# Patient Record
Sex: FEMALE | Race: WHITE | NOT HISPANIC OR LATINO
[De-identification: names, ages, dates, MRNs, and addresses within clinical notes are randomized per-mention and may not be internally consistent; named-entity substitution may affect disease eponyms.]

---

## 2019-08-14 ENCOUNTER — APPOINTMENT (OUTPATIENT)
Dept: ENDOCRINOLOGY | Facility: CLINIC | Age: 59
End: 2019-08-14
Payer: COMMERCIAL

## 2019-08-14 VITALS
HEIGHT: 64 IN | WEIGHT: 239 LBS | DIASTOLIC BLOOD PRESSURE: 80 MMHG | HEART RATE: 73 BPM | SYSTOLIC BLOOD PRESSURE: 120 MMHG | BODY MASS INDEX: 40.8 KG/M2

## 2019-08-14 DIAGNOSIS — Z86.79 PERSONAL HISTORY OF OTHER DISEASES OF THE CIRCULATORY SYSTEM: ICD-10-CM

## 2019-08-14 DIAGNOSIS — Z83.3 FAMILY HISTORY OF DIABETES MELLITUS: ICD-10-CM

## 2019-08-14 DIAGNOSIS — Z86.39 PERSONAL HISTORY OF OTHER ENDOCRINE, NUTRITIONAL AND METABOLIC DISEASE: ICD-10-CM

## 2019-08-14 DIAGNOSIS — Z80.9 FAMILY HISTORY OF MALIGNANT NEOPLASM, UNSPECIFIED: ICD-10-CM

## 2019-08-14 DIAGNOSIS — Z87.39 PERSONAL HISTORY OF OTHER DISEASES OF THE MUSCULOSKELETAL SYSTEM AND CONNECTIVE TISSUE: ICD-10-CM

## 2019-08-14 DIAGNOSIS — Z87.891 PERSONAL HISTORY OF NICOTINE DEPENDENCE: ICD-10-CM

## 2019-08-14 DIAGNOSIS — Z83.49 FAMILY HISTORY OF OTHER ENDOCRINE, NUTRITIONAL AND METABOLIC DISEASES: ICD-10-CM

## 2019-08-14 DIAGNOSIS — Z87.09 PERSONAL HISTORY OF OTHER DISEASES OF THE RESPIRATORY SYSTEM: ICD-10-CM

## 2019-08-14 DIAGNOSIS — Z82.49 FAMILY HISTORY OF ISCHEMIC HEART DISEASE AND OTHER DISEASES OF THE CIRCULATORY SYSTEM: ICD-10-CM

## 2019-08-14 PROCEDURE — 99245 OFF/OP CONSLTJ NEW/EST HI 55: CPT

## 2019-08-14 RX ORDER — TOPIRAMATE 25 MG
25 CAPSULE, SPRINKLE ORAL
Refills: 0 | Status: ACTIVE | COMMUNITY

## 2019-08-14 RX ORDER — UBIDECARENONE/VIT E ACET 100MG-5
1000 CAPSULE ORAL
Refills: 0 | Status: ACTIVE | COMMUNITY

## 2019-08-14 RX ORDER — DIAZEPAM 2 MG/1
2 TABLET ORAL
Refills: 0 | Status: ACTIVE | COMMUNITY

## 2019-08-14 RX ORDER — CETIRIZINE HCL 10 MG
10 TABLET ORAL
Refills: 0 | Status: ACTIVE | COMMUNITY

## 2019-08-14 RX ORDER — ACETAMINOPHEN/DIPHENHYDRAMINE 500MG-25MG
1000 TABLET ORAL
Refills: 0 | Status: ACTIVE | COMMUNITY

## 2019-08-14 RX ORDER — MONTELUKAST SODIUM 10 MG/1
TABLET, FILM COATED ORAL
Refills: 0 | Status: ACTIVE | COMMUNITY

## 2019-08-14 RX ORDER — BACILLUS COAGULANS/INULIN 1B-250 MG
CAPSULE ORAL
Refills: 0 | Status: ACTIVE | COMMUNITY

## 2019-08-14 NOTE — ASSESSMENT
[Carbohydrate Consistent Diet] : carbohydrate consistent diet [Importance of Diet and Exercise] : importance of diet and exercise to improve glycemic control, achieve weight loss and improve cardiovascular health [FreeTextEntry1] : Abnormal biochemical tests: low cortisol am 1.1 \par she was supposed to have surgery for salivary gland but surgery cancelled because low K and they felt it was related to adrenal "pathology". She takes flovent 1 puff BID. She 's been taking steroids on and off for her asthma, courses of 14 days. Last course of steroids was 2-3 mos ago. 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 \par check cortisol  and ACTH \par check all pituitary panel. \par we might consider Cortrosyn stim test \par this could be secondary to steroids use??? secondary to Sjogren???? \par She had stim test done March 2019 but it was labeled wrong. \par \par NTMNG : check thyroid US \par never FNA before but she was told it was growing \par check TFts \par \par morbid obesitY: \par discussed diet and exercise\par encouraged more exercise walking 30 min 3 x week\par \par vitamin d : continue vitamin d 1000 u qd. \par Needs to try to have more protein for meals\par \par hyperlipidemia: she declines statin. check lipids today \par \par hyperG : check A1c \par \par \par

## 2019-08-14 NOTE — PHYSICAL EXAM
[Alert] : alert [No Acute Distress] : no acute distress [Well Nourished] : well nourished [Well Developed] : well developed [Normal Sclera/Conjunctiva] : normal sclera/conjunctiva [EOMI] : extra ocular movement intact [No Proptosis] : no proptosis [Thyroid Not Enlarged] : the thyroid was not enlarged [Normal Oropharynx] : the oropharynx was normal [No Thyroid Nodules] : there were no palpable thyroid nodules [No Respiratory Distress] : no respiratory distress [No Accessory Muscle Use] : no accessory muscle use [Clear to Auscultation] : lungs were clear to auscultation bilaterally [Normal Rate] : heart rate was normal  [Normal S1, S2] : normal S1 and S2 [Regular Rhythm] : with a regular rhythm [Pedal Pulses Normal] : the pedal pulses are present [No Edema] : there was no peripheral edema [Normal Bowel Sounds] : normal bowel sounds [Not Tender] : non-tender [Soft] : abdomen soft [Not Distended] : not distended [Post Cervical Nodes] : posterior cervical nodes [Anterior Cervical Nodes] : anterior cervical nodes [Normal] : normal and non tender [No Stigmata of Cushings Syndrome] : no stigmata of cushings syndrome [Spine Straight] : spine straight [Normal Gait] : normal gait [No Rash] : no rash [Normal Strength/Tone] : muscle strength and tone were normal [Abdominal Striae] : abdominal striae [Normal Reflexes] : deep tendon reflexes were 2+ and symmetric [No Tremors] : no tremors [Oriented x3] : oriented to person, place, and time [Acanthosis Nigricans] : no acanthosis nigricans [de-identified] : varicose veins on legs

## 2019-09-17 ENCOUNTER — RX RENEWAL (OUTPATIENT)
Age: 59
End: 2019-09-17

## 2019-09-18 ENCOUNTER — APPOINTMENT (OUTPATIENT)
Dept: ENDOCRINOLOGY | Facility: CLINIC | Age: 59
End: 2019-09-18

## 2019-09-20 ENCOUNTER — OTHER (OUTPATIENT)
Age: 59
End: 2019-09-20

## 2019-09-20 ENCOUNTER — APPOINTMENT (OUTPATIENT)
Dept: ENDOCRINOLOGY | Facility: CLINIC | Age: 59
End: 2019-09-20
Payer: COMMERCIAL

## 2019-09-20 PROCEDURE — 96372 THER/PROPH/DIAG INJ SC/IM: CPT

## 2019-09-20 RX ORDER — CORTICOTROPIN
POWDER (GRAM) MISCELLANEOUS
Refills: 0 | Status: COMPLETED | OUTPATIENT
Start: 2019-09-20

## 2019-10-01 LAB
ACTH SER-ACNC: 6.3 PG/ML
CORTIS SERPL-MCNC: 12.6 UG/DL
CORTIS SERPL-MCNC: 3.7 UG/DL
CORTIS SERPL-MCNC: 9.6 UG/DL

## 2019-12-03 ENCOUNTER — APPOINTMENT (OUTPATIENT)
Dept: ENDOCRINOLOGY | Facility: CLINIC | Age: 59
End: 2019-12-03
Payer: COMMERCIAL

## 2019-12-03 VITALS
HEART RATE: 77 BPM | HEIGHT: 64 IN | BODY MASS INDEX: 41.15 KG/M2 | DIASTOLIC BLOOD PRESSURE: 70 MMHG | SYSTOLIC BLOOD PRESSURE: 110 MMHG | WEIGHT: 241 LBS

## 2019-12-03 PROCEDURE — 99214 OFFICE O/P EST MOD 30 MIN: CPT

## 2019-12-03 RX ORDER — VALSARTAN 160 MG/1
160 TABLET ORAL
Refills: 0 | Status: ACTIVE | COMMUNITY

## 2019-12-03 RX ORDER — CORTICOTROPIN
POWDER (GRAM) MISCELLANEOUS
Qty: 1 | Refills: 0 | Status: DISCONTINUED | COMMUNITY
Start: 2019-09-17 | End: 2019-12-03

## 2019-12-03 NOTE — ASSESSMENT
[Carbohydrate Consistent Diet] : carbohydrate consistent diet [Importance of Diet and Exercise] : importance of diet and exercise to improve glycemic control, achieve weight loss and improve cardiovascular health [FreeTextEntry1] : Abnormal biochemical tests: low cortisol am 1.1 \par she had salivary gland surgery recently. She had cortrosyn stim test suboptimal , i suspect due to her inhaled steroids that she takes daily .She also had another oral course of steroids for 1 weeks. 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 \par discussed sick days management., Will give HC iv for the day of surgery just in case, since not able to do a perfect stim test to rule out AI. She is very asymptomatic for AI . \par all lab results reviewed and discussed with patient. All questions answered\par \par NTMNG : \par US report revised and results discussed with patient. \par Images reviewed and compared to prior study.\par 5 mm nodule R lobe unchanged. Monitor and repeat US in Sept 2020. \par \par morbid obesitY: \par discussed diet and exercise\par encouraged more exercise walking 30 min 3 x week\par \par vitamin d : continue vitamin d 1000 u qd. \par Needs to try to have more protein for meals\par \par hyperlipidemia: she declines statin. check lipids today \par LDl high 170 .She wants to try fish oil 2 mg qd \par repeat lipids in 3 mos. She wants to try first fish oil then she will think about statin.\par \par prediabetes :  A1c 6. \par discussed diet and exercise\par encouraged more exercise walking 30 min 3 x week\par Discussed complications of diabetes at length including MI/CVA/ESRD/dialysis/blindness/amputations/infections\par Needs to try to have more protein for meals\par \par Abnormal TFts : TSh slightly high \par will repeat TFts in 3 mos and TPo ab \par \par \par \par

## 2019-12-03 NOTE — PHYSICAL EXAM
[Alert] : alert [No Acute Distress] : no acute distress [Well Nourished] : well nourished [Well Developed] : well developed [Normal Sclera/Conjunctiva] : normal sclera/conjunctiva [EOMI] : extra ocular movement intact [No Proptosis] : no proptosis [Normal Oropharynx] : the oropharynx was normal [Thyroid Not Enlarged] : the thyroid was not enlarged [No Thyroid Nodules] : there were no palpable thyroid nodules [No Respiratory Distress] : no respiratory distress [No Accessory Muscle Use] : no accessory muscle use [Clear to Auscultation] : lungs were clear to auscultation bilaterally [Normal Rate] : heart rate was normal  [Normal S1, S2] : normal S1 and S2 [Regular Rhythm] : with a regular rhythm [Pedal Pulses Normal] : the pedal pulses are present [No Edema] : there was no peripheral edema [Normal Bowel Sounds] : normal bowel sounds [Not Tender] : non-tender [Soft] : abdomen soft [Not Distended] : not distended [Post Cervical Nodes] : posterior cervical nodes [Anterior Cervical Nodes] : anterior cervical nodes [Normal] : normal and non tender [Spine Straight] : spine straight [No Stigmata of Cushings Syndrome] : no stigmata of cushings syndrome [Normal Gait] : normal gait [Normal Strength/Tone] : muscle strength and tone were normal [No Rash] : no rash [Abdominal Striae] : abdominal striae [Normal Reflexes] : deep tendon reflexes were 2+ and symmetric [No Tremors] : no tremors [Oriented x3] : oriented to person, place, and time [Acanthosis Nigricans] : no acanthosis nigricans [de-identified] : varicose veins on legs

## 2019-12-03 NOTE — HISTORY OF PRESENT ILLNESS
[FreeTextEntry1] : follow up for abnormal cortisol in patient with severe asthma and frequent doses of steroids. \par Sd Sjogren

## 2020-01-17 ENCOUNTER — APPOINTMENT (OUTPATIENT)
Dept: ENDOCRINOLOGY | Facility: CLINIC | Age: 60
End: 2020-01-17
Payer: COMMERCIAL

## 2020-01-17 VITALS
BODY MASS INDEX: 40.97 KG/M2 | DIASTOLIC BLOOD PRESSURE: 90 MMHG | HEIGHT: 64 IN | HEART RATE: 78 BPM | WEIGHT: 240 LBS | SYSTOLIC BLOOD PRESSURE: 140 MMHG

## 2020-01-17 DIAGNOSIS — E04.2 NONTOXIC MULTINODULAR GOITER: ICD-10-CM

## 2020-01-17 PROCEDURE — 99214 OFFICE O/P EST MOD 30 MIN: CPT

## 2020-01-17 RX ORDER — ALBUTEROL SULFATE 0.63 MG/3ML
0.63 SOLUTION RESPIRATORY (INHALATION)
Refills: 0 | Status: ACTIVE | COMMUNITY

## 2020-01-17 RX ORDER — PREDNISONE 50 MG/1
50 TABLET ORAL
Refills: 0 | Status: ACTIVE | COMMUNITY

## 2020-01-17 NOTE — HISTORY OF PRESENT ILLNESS
[FreeTextEntry1] : follow up for abnormal cortisol in patient with severe asthma and frequent doses of steroids. \par Sd Sjogren\par she was admitted with flu and ascute asthma 2 weeks ago and she takes large amount of steroids again \par

## 2020-01-17 NOTE — PHYSICAL EXAM
[Alert] : alert [No Acute Distress] : no acute distress [Well Developed] : well developed [Well Nourished] : well nourished [Normal Sclera/Conjunctiva] : normal sclera/conjunctiva [EOMI] : extra ocular movement intact [Normal Oropharynx] : the oropharynx was normal [No Proptosis] : no proptosis [No Thyroid Nodules] : there were no palpable thyroid nodules [Thyroid Not Enlarged] : the thyroid was not enlarged [No Accessory Muscle Use] : no accessory muscle use [Normal Rate] : heart rate was normal  [Normal S1, S2] : normal S1 and S2 [Pedal Pulses Normal] : the pedal pulses are present [Regular Rhythm] : with a regular rhythm [No Edema] : there was no peripheral edema [Not Tender] : non-tender [Soft] : abdomen soft [Normal Bowel Sounds] : normal bowel sounds [Post Cervical Nodes] : posterior cervical nodes [Anterior Cervical Nodes] : anterior cervical nodes [Not Distended] : not distended [Normal] : normal and non tender [Spine Straight] : spine straight [No Stigmata of Cushings Syndrome] : no stigmata of cushings syndrome [Normal Gait] : normal gait [No Rash] : no rash [Abdominal Striae] : abdominal striae [Oriented x3] : oriented to person, place, and time [No Tremors] : no tremors [Acanthosis Nigricans] : no acanthosis nigricans [de-identified] : varicose veins on legs  [de-identified] : wheezing B/L

## 2020-01-17 NOTE — ASSESSMENT
[Importance of Diet and Exercise] : importance of diet and exercise to improve glycemic control, achieve weight loss and improve cardiovascular health [Carbohydrate Consistent Diet] : carbohydrate consistent diet [FreeTextEntry1] : Abnormal biochemical tests: low cortisol in the past probably checked after courses of steroids. Right now recovering from flu and severe acute asthma. She is on large steroids She will notify me when steroids will be stopped. \par She had a suboptimal stim test done but she is on daily inhaled steroids . \par \par NTMNG : \par US report revised and results discussed with patient. \par Images reviewed and compared to prior study.\par 5 mm nodule R lobe unchanged. Monitor and repeat US in Sept 2020. \par \par morbid obesitY: \par discussed diet and exercise\par encouraged more exercise walking 30 min 3 x week\par \par vitamin d : continue vitamin d 1000 u qd. \par \par hyperlipidemia: she declines statin. LDL better down to 134. \par start fish oil 2 mg qd \par repeat lipids in 3 mos. She wants to try first fish oil then she will think about statin.\par \par prediabetes :  A1c 6. \par discussed diet and exercise\par encouraged more exercise walking 30 min 3 x week\par Discussed complications of diabetes at length including MI/CVA/ESRD/dialysis/blindness/amputations/infections\par Needs to try to have more protein for meals\par \par Abnormal TFts : TSH slightly suppressed. Could be nonthyroidal disease. \par will monitor and repeat labs in 3 mos. \par \par \par \par

## 2020-02-04 RX ORDER — BLOOD-GLUCOSE METER
W/DEVICE EACH MISCELLANEOUS
Qty: 1 | Refills: 0 | Status: ACTIVE | COMMUNITY
Start: 2020-02-04 | End: 1900-01-01

## 2020-02-04 RX ORDER — BLOOD SUGAR DIAGNOSTIC
STRIP MISCELLANEOUS
Qty: 1 | Refills: 0 | Status: ACTIVE | COMMUNITY
Start: 2020-02-04 | End: 1900-01-01

## 2020-02-04 RX ORDER — LANCETS
EACH MISCELLANEOUS
Qty: 1 | Refills: 0 | Status: ACTIVE | COMMUNITY
Start: 2020-02-04 | End: 1900-01-01

## 2020-03-03 ENCOUNTER — APPOINTMENT (OUTPATIENT)
Dept: ENDOCRINOLOGY | Facility: CLINIC | Age: 60
End: 2020-03-03
Payer: COMMERCIAL

## 2020-03-03 VITALS
SYSTOLIC BLOOD PRESSURE: 142 MMHG | WEIGHT: 250 LBS | BODY MASS INDEX: 42.68 KG/M2 | DIASTOLIC BLOOD PRESSURE: 88 MMHG | HEIGHT: 64 IN

## 2020-03-03 DIAGNOSIS — R73.03 PREDIABETES.: ICD-10-CM

## 2020-03-03 DIAGNOSIS — R94.7 ABNORMAL RESULTS OF OTHER ENDOCRINE FUNCTION STUDIES: ICD-10-CM

## 2020-03-03 DIAGNOSIS — E78.5 HYPERLIPIDEMIA, UNSPECIFIED: ICD-10-CM

## 2020-03-03 DIAGNOSIS — R73.9 HYPERGLYCEMIA, UNSPECIFIED: ICD-10-CM

## 2020-03-03 PROCEDURE — 99214 OFFICE O/P EST MOD 30 MIN: CPT

## 2020-03-03 RX ORDER — CLINDAMYCIN HYDROCHLORIDE 150 MG/1
150 CAPSULE ORAL
Refills: 0 | Status: DISCONTINUED | COMMUNITY
End: 2020-03-03

## 2020-03-03 RX ORDER — FLUTICASONE PROPION/SALMETEROL 500-50 MCG
500-50 BLISTER, WITH INHALATION DEVICE INHALATION
Refills: 0 | Status: ACTIVE | COMMUNITY

## 2020-03-03 RX ORDER — FLUTICASONE PROPIONATE 110 UG/1
110 AEROSOL, METERED RESPIRATORY (INHALATION)
Refills: 0 | Status: DISCONTINUED | COMMUNITY
End: 2020-03-03

## 2020-03-03 NOTE — PHYSICAL EXAM
[Alert] : alert [Well Nourished] : well nourished [No Acute Distress] : no acute distress [Normal Sclera/Conjunctiva] : normal sclera/conjunctiva [EOMI] : extra ocular movement intact [Well Developed] : well developed [Normal Oropharynx] : the oropharynx was normal [No Proptosis] : no proptosis [Thyroid Not Enlarged] : the thyroid was not enlarged [No Thyroid Nodules] : there were no palpable thyroid nodules [No Accessory Muscle Use] : no accessory muscle use [Normal Rate] : heart rate was normal  [Normal S1, S2] : normal S1 and S2 [Regular Rhythm] : with a regular rhythm [No Edema] : there was no peripheral edema [Pedal Pulses Normal] : the pedal pulses are present [Normal Bowel Sounds] : normal bowel sounds [Not Tender] : non-tender [Soft] : abdomen soft [Post Cervical Nodes] : posterior cervical nodes [Not Distended] : not distended [Anterior Cervical Nodes] : anterior cervical nodes [Normal] : normal and non tender [Spine Straight] : spine straight [No Stigmata of Cushings Syndrome] : no stigmata of cushings syndrome [No Rash] : no rash [Normal Gait] : normal gait [Abdominal Striae] : abdominal striae [No Tremors] : no tremors [Oriented x3] : oriented to person, place, and time [Acanthosis Nigricans] : no acanthosis nigricans [de-identified] : full supraclavicular fossae, dorsocervical fat pad [de-identified] : wheezing B/L  [de-identified] : varicose veins on legs

## 2020-03-03 NOTE — ASSESSMENT
[Carbohydrate Consistent Diet] : carbohydrate consistent diet [Importance of Diet and Exercise] : importance of diet and exercise to improve glycemic control, achieve weight loss and improve cardiovascular health [FreeTextEntry1] : Abnormal biochemical tests: low cortisol in the past probably checked after courses of steroids. \par Now off steroids for 4 weeks. She has no sx of AI. She had low levels in the past but due to checking after a course of steroids. She has developed Cushingoid -like features due to high and frequent doses of steroids with full supraclavicular fossae and dorsocervical fat pad. . \par \par NTMNG : \par 5 mm nodule R lobe unchanged. Monitor and repeat US in Sept 2020. \par \par morbid obesitY: she gained 10 lbs \par discussed diet and exercise\par encouraged more exercise walking 30 min 3 x week\par \par vitamin d : continue vitamin d 1000 u qd. \par \par hyperlipidemia: she declines statin for now. LDl high for her prediabetes and obesity as risk factors. She want sa trial of fish oil 2 gm qd and repeat lipids in 3 mos and if still high LDL add statin. \par \par prediabetes :  A1c 6 stable for now. Probably steroids induced. \par discussed diet and exercise\par encouraged more exercise walking 30 min 3 x week\par \par Abnormal TFts : TSH slightly suppressed. Could be nonthyroidal disease. \par will monitor and repeat labs in 3 mos. \par \par \par \par

## 2020-03-03 NOTE — HISTORY OF PRESENT ILLNESS
[FreeTextEntry1] : follow up for abnormal cortisol in patient with severe asthma and frequent doses of steroids. \par Sd Sjogren\par

## 2020-09-09 ENCOUNTER — APPOINTMENT (OUTPATIENT)
Dept: ENDOCRINOLOGY | Facility: CLINIC | Age: 60
End: 2020-09-09

## 2022-10-29 ENCOUNTER — NON-APPOINTMENT (OUTPATIENT)
Age: 62
End: 2022-10-29

## 2023-01-13 ENCOUNTER — NON-APPOINTMENT (OUTPATIENT)
Age: 63
End: 2023-01-13

## 2023-03-01 ENCOUNTER — NON-APPOINTMENT (OUTPATIENT)
Age: 63
End: 2023-03-01

## 2023-03-03 ENCOUNTER — NON-APPOINTMENT (OUTPATIENT)
Age: 63
End: 2023-03-03

## 2023-06-13 ENCOUNTER — APPOINTMENT (OUTPATIENT)
Dept: COLORECTAL SURGERY | Facility: CLINIC | Age: 63
End: 2023-06-13
Payer: COMMERCIAL

## 2023-06-13 ENCOUNTER — NON-APPOINTMENT (OUTPATIENT)
Age: 63
End: 2023-06-13

## 2023-06-13 VITALS
SYSTOLIC BLOOD PRESSURE: 130 MMHG | WEIGHT: 232 LBS | OXYGEN SATURATION: 98 % | HEIGHT: 64 IN | TEMPERATURE: 97.3 F | DIASTOLIC BLOOD PRESSURE: 72 MMHG | HEART RATE: 78 BPM | BODY MASS INDEX: 39.61 KG/M2 | RESPIRATION RATE: 15 BRPM

## 2023-06-13 PROCEDURE — 99205 OFFICE O/P NEW HI 60 MIN: CPT

## 2023-06-13 RX ORDER — METRONIDAZOLE 500 MG/1
500 TABLET ORAL
Qty: 6 | Refills: 0 | Status: ACTIVE | COMMUNITY
Start: 2023-06-13 | End: 1900-01-01

## 2023-06-13 RX ORDER — NEOMYCIN SULFATE 500 MG/1
500 TABLET ORAL
Qty: 6 | Refills: 0 | Status: ACTIVE | COMMUNITY
Start: 2023-06-13 | End: 1900-01-01

## 2023-06-13 NOTE — ASSESSMENT
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  Given her immunocompromise state I believe she is at higher risk of perforation and complications from her diverticulitis and recommend surgery.  Laparoscopic possible open sigmoid colon resection.  Risk and benefits of the surgery have been discussed which include bleeding, infection, sepsis, multiorgan failure, inadvertent injury including hollow viscus, solid organ, ureter neurovascular and neurological structures, DVT PE, heart attack, stroke, hernias, recurrence, leakage of anastomosis require reoperation possible stoma and death.

## 2023-06-13 NOTE — PHYSICAL EXAM
[Abdomen Masses] : No abdominal masses [Abdomen Tenderness] : ~T ~M Abdominal tenderness [Tender] : nontender [JVD] : no jugular venous distention  [Normal Breath Sounds] : Normal breath sounds [Normal Heart Sounds] : normal heart sounds [Normal Rate and Rhythm] : normal rate and rhythm [No Rash or Lesion] : No rash or lesion [Alert] : alert [Oriented to Person] : oriented to person [Oriented to Place] : oriented to place [Oriented to Time] : oriented to time [Calm] : calm [de-identified] : Morbidly obese [de-identified] : Looks well in no distress, of stated age. [de-identified] : Pupils equal reactive to light normocephalic atraumatic. [de-identified] : Moves all 4 extremities appropriately with 5 over 5 strength

## 2023-06-13 NOTE — CONSULT LETTER
[Dear  ___] : Dear  [unfilled], [Consult Letter:] : I had the pleasure of evaluating your patient, [unfilled]. [Please see my note below.] : Please see my note below. [Consult Closing:] : Thank you very much for allowing me to participate in the care of this patient.  If you have any questions, please do not hesitate to contact me. [Sincerely,] : Sincerely, [FreeTextEntry3] : Miles Sumner M.D. FACS, FASCRS

## 2023-06-13 NOTE — HISTORY OF PRESENT ILLNESS
[FreeTextEntry1] : Initial office visit for this 62-year-old morbidly obese female with common variable immunodeficiency and multiple recurrent sigmoid diverticulitis including complicated episode with microperforation.  Symptoms have been worsening with increasing attacks of diverticulitis requiring extensive antibiotics.

## 2023-06-30 ENCOUNTER — OUTPATIENT (OUTPATIENT)
Dept: OUTPATIENT SERVICES | Facility: HOSPITAL | Age: 63
LOS: 1 days | End: 2023-06-30
Payer: COMMERCIAL

## 2023-06-30 ENCOUNTER — RESULT REVIEW (OUTPATIENT)
Age: 63
End: 2023-06-30

## 2023-06-30 VITALS
OXYGEN SATURATION: 98 % | RESPIRATION RATE: 16 BRPM | HEART RATE: 67 BPM | TEMPERATURE: 97 F | HEIGHT: 64 IN | SYSTOLIC BLOOD PRESSURE: 131 MMHG | WEIGHT: 233.91 LBS | DIASTOLIC BLOOD PRESSURE: 73 MMHG

## 2023-06-30 DIAGNOSIS — Z90.89 ACQUIRED ABSENCE OF OTHER ORGANS: Chronic | ICD-10-CM

## 2023-06-30 DIAGNOSIS — Z98.890 OTHER SPECIFIED POSTPROCEDURAL STATES: Chronic | ICD-10-CM

## 2023-06-30 DIAGNOSIS — E66.01 MORBID (SEVERE) OBESITY DUE TO EXCESS CALORIES: ICD-10-CM

## 2023-06-30 DIAGNOSIS — Z01.818 ENCOUNTER FOR OTHER PREPROCEDURAL EXAMINATION: ICD-10-CM

## 2023-06-30 LAB
A1C WITH ESTIMATED AVERAGE GLUCOSE RESULT: 6 % — HIGH (ref 4–5.6)
ABO RH CONFIRMATION: SIGNIFICANT CHANGE UP
BLD GP AB SCN SERPL QL: SIGNIFICANT CHANGE UP
ESTIMATED AVERAGE GLUCOSE: 126 MG/DL — HIGH (ref 68–114)

## 2023-06-30 PROCEDURE — 36415 COLL VENOUS BLD VENIPUNCTURE: CPT

## 2023-06-30 PROCEDURE — 86901 BLOOD TYPING SEROLOGIC RH(D): CPT

## 2023-06-30 PROCEDURE — 99214 OFFICE O/P EST MOD 30 MIN: CPT | Mod: 25

## 2023-06-30 PROCEDURE — 71046 X-RAY EXAM CHEST 2 VIEWS: CPT

## 2023-06-30 PROCEDURE — 71046 X-RAY EXAM CHEST 2 VIEWS: CPT | Mod: 26

## 2023-06-30 PROCEDURE — 86900 BLOOD TYPING SEROLOGIC ABO: CPT

## 2023-06-30 PROCEDURE — 83036 HEMOGLOBIN GLYCOSYLATED A1C: CPT

## 2023-06-30 PROCEDURE — 86850 RBC ANTIBODY SCREEN: CPT

## 2023-06-30 RX ORDER — TOPIRAMATE 25 MG
1 TABLET ORAL
Refills: 0 | DISCHARGE

## 2023-06-30 RX ORDER — TAFLUPROST 0 MG/.3ML
1 SOLUTION/ DROPS OPHTHALMIC
Refills: 0 | DISCHARGE

## 2023-06-30 RX ORDER — HYOSCYAMINE SULFATE 0.13 MG
1 TABLET ORAL
Refills: 0 | DISCHARGE

## 2023-06-30 RX ORDER — FUROSEMIDE 40 MG
1 TABLET ORAL
Refills: 0 | DISCHARGE

## 2023-06-30 RX ORDER — BACILLUS COAGULANS/INULIN 1B-250 MG
1 CAPSULE ORAL
Refills: 0 | DISCHARGE

## 2023-06-30 RX ORDER — IPRATROPIUM/ALBUTEROL SULFATE 18-103MCG
3 AEROSOL WITH ADAPTER (GRAM) INHALATION
Refills: 0 | DISCHARGE

## 2023-06-30 RX ORDER — ALBUTEROL 90 UG/1
3 AEROSOL, METERED ORAL
Refills: 0 | DISCHARGE

## 2023-06-30 RX ORDER — ALBUTEROL 90 UG/1
2 AEROSOL, METERED ORAL
Refills: 0 | DISCHARGE

## 2023-06-30 RX ORDER — ASCORBIC ACID 60 MG
0 TABLET,CHEWABLE ORAL
Refills: 0 | DISCHARGE

## 2023-06-30 RX ORDER — POTASSIUM BICARBONATE 978 MG/1
1 TABLET, EFFERVESCENT ORAL
Refills: 0 | DISCHARGE

## 2023-06-30 RX ORDER — ERGOCALCIFEROL 1.25 MG/1
0 CAPSULE ORAL
Refills: 0 | DISCHARGE

## 2023-06-30 RX ORDER — LOSARTAN POTASSIUM 100 MG/1
1 TABLET, FILM COATED ORAL
Refills: 0 | DISCHARGE

## 2023-06-30 RX ORDER — DIAZEPAM 5 MG
1 TABLET ORAL
Refills: 0 | DISCHARGE

## 2023-06-30 RX ORDER — MONTELUKAST 4 MG/1
1 TABLET, CHEWABLE ORAL
Refills: 0 | DISCHARGE

## 2023-06-30 RX ORDER — FLUTICASONE PROPIONATE 220 MCG
0 AEROSOL WITH ADAPTER (GRAM) INHALATION
Refills: 0 | DISCHARGE

## 2023-06-30 RX ORDER — CETIRIZINE HYDROCHLORIDE 10 MG/1
1 TABLET ORAL
Refills: 0 | DISCHARGE

## 2023-06-30 NOTE — H&P PST ADULT - NS PRO PASSIVE SMOKE EXP
HISTORY OF PRESENT ILLNESS  Dimple Kayser Kevon Fass is a 49 y.o. female. HPI  [facility] with PMHx significant for moderate intermittent ashtma, prediabetes, allergic rhinitis here today for an acute visit with chief complaint of ongoing cough x 2 months. She reports that she went to the ED at [facility] in December 2018, where she received a breathing treatment and a prescription for prednisone. She completed the course of prednisone but was not given an antibiotic. Reports that her symptoms did not improve with the prednisone. She has not been re-evaluated for this since then. She is not a smoker. She takes dulera twice daily and albuterol about once daily. She denies any associated wheezing. Has shortness of breath when she \"sucks in too much air. \" Cough is non-productive. Denies any associated symptoms. Has not been taking her flonase or claritin as she typically needs those only during allergy season (spring/fall). Vital signs are WNL today. Visit Vitals  /77 (BP 1 Location: Right arm, BP Patient Position: Sitting)   Pulse 87   Temp 97.5 °F (36.4 °C) (Oral)   Resp 16   Ht 5' 3\" (1.6 m)   Wt 149 lb 9.6 oz (67.9 kg)   LMP 12/25/2018 (Approximate)   SpO2 96%    L/min   BMI 26.50 kg/m²     Current Outpatient Medications on File Prior to Visit   Medication Sig Dispense Refill    albuterol (PROVENTIL HFA, VENTOLIN HFA, PROAIR HFA) 90 mcg/actuation inhaler Take 2 Puffs by inhalation every six (6) hours as needed for Wheezing. 1 Inhaler 11    mometasone-formoterol (DULERA) 200-5 mcg/actuation HFA inhaler Take 2 Puffs by inhalation two (2) times a day. 1 Inhaler 5    fluticasone (FLONASE) 50 mcg/actuation nasal spray 2 Sprays by Both Nostrils route daily. 1 Bottle     loratadine (CLARITIN) 10 mg tablet Take 1 Tab by mouth daily. No current facility-administered medications on file prior to visit.           Review of Systems   Constitutional: Negative for chills, fever and malaise/fatigue. HENT: Negative for sinus pain and sore throat. Eyes: Negative for blurred vision and double vision. Respiratory: Positive for cough and shortness of breath. Negative for sputum production and wheezing. Cardiovascular: Negative for chest pain, palpitations, orthopnea and leg swelling. Neurological: Negative for dizziness and headaches. Physical Exam   Constitutional: She is oriented to person, place, and time. She appears well-developed and well-nourished. No distress. HENT:   Head: Normocephalic and atraumatic. Mouth/Throat: Oropharynx is clear and moist. No oropharyngeal exudate. Cardiovascular: Normal rate, regular rhythm and normal heart sounds. Pulmonary/Chest: Effort normal and breath sounds normal. No respiratory distress. She has no wheezes. She has no rales. Intermittent non-productive, hacking cough   Lymphadenopathy:     She has no cervical adenopathy. Neurological: She is alert and oriented to person, place, and time. Skin: Skin is warm and dry. She is not diaphoretic. Psychiatric: She has a normal mood and affect. Her behavior is normal. Judgment normal.   Nursing note and vitals reviewed. ASSESSMENT and PLAN    ICD-10-CM ICD-9-CM    1. Chronic cough R05 786.2 XR CHEST PA LAT      benzonatate (TESSALON) 200 mg capsule      amoxicillin-clavulanate (AUGMENTIN) 875-125 mg per tablet   2. Moderate intermittent asthma without complication E88.56 063.57 XR CHEST PA LAT     1. Chronic cough - Normal CXR today and her lungs sound clear. However, will Rx augmentin x 10 days along with tessalon for cough relief. I also advised her to restart her flonase and claritin as there may be an allergic component to this as well. Patient was advised to call me in 2 weeks if symptoms have not improved for referral to pulmonology, and she agreed with this plan. Follow-up Disposition:  Return if symptoms worsen or fail to improve. No

## 2023-06-30 NOTE — H&P PST ADULT - NSICDXPASTSURGICALHX_GEN_ALL_CORE_FT
PAST SURGICAL HISTORY:  H/O colonoscopy     History of ovarian cystectomy     History of tonsillectomy

## 2023-06-30 NOTE — H&P PST ADULT - NSICDXFAMILYHX_GEN_ALL_CORE_FT
FAMILY HISTORY:  Father  Still living? Unknown  FH: prostate cancer, Age at diagnosis: Age Unknown    Mother  Still living? Unknown  FH: lung cancer, Age at diagnosis: Age Unknown    Grandparent  Still living? Unknown  FH: heart attack, Age at diagnosis: Age Unknown  FH: stroke, Age at diagnosis: Age Unknown

## 2023-06-30 NOTE — H&P PST ADULT - ASSESSMENT
Plan:  1. PST instructions given ; NPO status/  instructions to be given by ASU   2. Pt instructed to take following meds on day of surgery:   3. Pt instructed to take routine evening medications unless indicated   4. Stop NSAIDS ( Aspirin Alev Motrin Mobic Diclofenac), herbal supplements , MVI , Vitamin fish oil 7 days prior to surgery  unless   directed by surgeon or cardiologist;   5. Medical Optimization  with    6. EZ wash instructions given & mupirocin instructions given  7. Labs EKG CXR as per surgeon request   8. Pt denies covid symptoms shortness of breath fever cough     CAPRINI SCORE [CLOT]    AGE RELATED RISK FACTORS                                                       MOBILITY RELATED FACTORS  [ ] Age 41-60 years                                            (1 Point)                  [ ] Bed rest                                                        (1 Point)  [ ] Age: 61-74 years                                           (2 Points)                 [ ] Plaster cast                                                   (2 Points)  [ ] Age= 75 years                                              (3 Points)                 [ ] Bed bound for more than 72 hours                 (2 Points)    DISEASE RELATED RISK FACTORS                                               GENDER SPECIFIC FACTORS  [ ] Edema in the lower extremities                       (1 Point)                  [ ] Pregnancy                                                     (1 Point)  [ ] Varicose veins                                               (1 Point)                  [ ] Post-partum < 6 weeks                                   (1 Point)             [ ] BMI > 25 Kg/m2                                            (1 Point)                  [ ] Hormonal therapy  or oral contraception          (1 Point)                 [ ] Sepsis (in the previous month)                        (1 Point)                  [ ] History of pregnancy complications                 (1 point)  [ ] Pneumonia or serious lung disease                                               [ ] Unexplained or recurrent                     (1 Point)           (in the previous month)                               (1 Point)  [ ] Abnormal pulmonary function test                     (1 Point)                 SURGERY RELATED RISK FACTORS  [ ] Acute myocardial infarction                              (1 Point)                 [ ]  Section                                             (1 Point)  [ ] Congestive heart failure (in the previous month)  (1 Point)               [ ] Minor surgery                                                  (1 Point)   [ ] Inflammatory bowel disease                             (1 Point)                 [ ] Arthroscopic surgery                                        (2 Points)  [ ] Central venous access                                      (2 Points)                [ ] General surgery lasting more than 45 minutes   (2 Points)       [ ] Stroke (in the previous month)                          (5 Points)               [ ] Elective arthroplasty                                         (5 Points)            ( ) presents and past malignancy                           (2 points)                                                                                                         HEMATOLOGY RELATED FACTORS                                                 TRAUMA RELATED RISK FACTORS  [ ] Prior episodes of VTE                                     (3 Points)                 [ ] Fracture of the hip, pelvis, or leg                       (5 Points)  [ ] Positive family history for VTE                         (3 Points)                 [ ] Acute spinal cord injury (in the previous month)  (5 Points)  [ ] Prothrombin 93044 A                                     (3 Points)                 [ ] Paralysis  (less than 1 month)                             (5 Points)  [ ] Factor V Leiden                                             (3 Points)                  [ ] Multiple Trauma within 1 month                        (5 Points)  [ ] Lupus anticoagulants                                     (3 Points)                                                           [ ] Anticardiolipin antibodies                               (3 Points)                                                       [ ] High homocysteine in the blood                      (3 Points)                                             [ ] Other congenital or acquired thrombophilia      (3 Points)                                                [ ] Heparin induced thrombocytopenia                  (3 Points)                                          Total Score [          ]         62 year old female diagnosed with diverticulitis; Pt has been having multiple exacerbations; pt said last year she had perforated colon; c/o left sided abdominal pain and tenderness; she presents to PST for planned laparoscopic  possible open sigmoid colon resection       Plan:  1. PST instructions given ; NPO status/  instructions to be given by ASU   2. Pt instructed to take following meds on day of surgery: flovent losartan and albuterol prn   3. Pt instructed to take routine evening medications unless indicated   4. Stop NSAIDS ( Aspirin Alev Motrin Mobic Diclofenac), herbal supplements , MVI , Vitamin fish oil 7 days prior to surgery  unless   directed by surgeon or cardiologist;   5. Medical Optimization  with Dr Ambrose and Dr Hogue hematology   6. EZ wash instructions given & mupirocin instructions given  7. Labs EKG CXR as per surgeon request   8. Pt denies covid symptoms shortness of breath fever cough     CAPRINI SCORE [CLOT]    AGE RELATED RISK FACTORS                                                       MOBILITY RELATED FACTORS  [ ] Age 41-60 years                                            (1 Point)                  [ ] Bed rest                                                        (1 Point)  [x ] Age: 61-74 years                                           (2 Points)                 [ ] Plaster cast                                                   (2 Points)  [ ] Age= 75 years                                              (3 Points)                 [ ] Bed bound for more than 72 hours                 (2 Points)    DISEASE RELATED RISK FACTORS                                               GENDER SPECIFIC FACTORS  [ ] Edema in the lower extremities                       (1 Point)                  [ ] Pregnancy                                                     (1 Point)  [ ] Varicose veins                                               (1 Point)                  [ ] Post-partum < 6 weeks                                   (1 Point)             [ x] BMI > 25 Kg/m2                                            (1 Point)                  [ ] Hormonal therapy  or oral contraception          (1 Point)                 [ ] Sepsis (in the previous month)                        (1 Point)                  [ ] History of pregnancy complications                 (1 point)  [ ] Pneumonia or serious lung disease                                               [ ] Unexplained or recurrent                     (1 Point)           (in the previous month)                               (1 Point)  [ ] Abnormal pulmonary function test                     (1 Point)                 SURGERY RELATED RISK FACTORS  [ ] Acute myocardial infarction                              (1 Point)                 [ ]  Section                                             (1 Point)  [ ] Congestive heart failure (in the previous month)  (1 Point)               [ ] Minor surgery                                                  (1 Point)   [x ] Inflammatory bowel disease                             (1 Point)                 [ ] Arthroscopic surgery                                        (2 Points)  [ ] Central venous access                                      (2 Points)                [x ] General surgery lasting more than 45 minutes   (2 Points)       [ ] Stroke (in the previous month)                          (5 Points)               [ ] Elective arthroplasty                                         (5 Points)            ( ) presents and past malignancy                           (2 points)                                                                                                         HEMATOLOGY RELATED FACTORS                                                 TRAUMA RELATED RISK FACTORS  [ ] Prior episodes of VTE                                     (3 Points)                 [ ] Fracture of the hip, pelvis, or leg                       (5 Points)  [ ] Positive family history for VTE                         (3 Points)                 [ ] Acute spinal cord injury (in the previous month)  (5 Points)  [ ] Prothrombin 05149 A                                     (3 Points)                 [ ] Paralysis  (less than 1 month)                             (5 Points)  [ ] Factor V Leiden                                             (3 Points)                  [ ] Multiple Trauma within 1 month                        (5 Points)  [ ] Lupus anticoagulants                                     (3 Points)                                                           [ ] Anticardiolipin antibodies                               (3 Points)                                                       [ ] High homocysteine in the blood                      (3 Points)                                             [ ] Other congenital or acquired thrombophilia      (3 Points)                                                [ ] Heparin induced thrombocytopenia                  (3 Points)                                          Total Score [     6     ]    The Caprini score indicates that this patient is at high risk for a VTE event (score 6 or greater). Surgical patients in this group will benefit from both pharmacologic prophylaxis and intermittent compression devices.  The surgical team will determine the balance between VTE risk and bleeding risk, and other clinical considerations

## 2023-06-30 NOTE — H&P PST ADULT - NSICDXPASTMEDICALHX_GEN_ALL_CORE_FT
PAST MEDICAL HISTORY:  Abnormal cortisol level     Anxiety     Asthma     Cataract     Chronic sialoadenitis     Diverticulitis     Diverticulitis of colon with perforation     GERD (gastroesophageal reflux disease)     Glaucoma     Hashimoto's thyroiditis     Heart murmur     HLD (hyperlipidemia)     HTN (hypertension)     IgG deficiency     Obese     Open wound of skin     [place] (obstructive sleep apnea)     Prediabetes     Rheumatoid arthritis     Sjogren's disease     TMJ disease     Tremors of nervous system     Venous insufficiency

## 2023-07-01 DIAGNOSIS — Z01.818 ENCOUNTER FOR OTHER PREPROCEDURAL EXAMINATION: ICD-10-CM

## 2023-07-01 DIAGNOSIS — E66.01 MORBID (SEVERE) OBESITY DUE TO EXCESS CALORIES: ICD-10-CM

## 2023-07-13 ENCOUNTER — APPOINTMENT (OUTPATIENT)
Dept: COLORECTAL SURGERY | Facility: HOSPITAL | Age: 63
End: 2023-07-13

## 2023-08-09 PROBLEM — I87.2 VENOUS INSUFFICIENCY (CHRONIC) (PERIPHERAL): Chronic | Status: ACTIVE | Noted: 2023-06-30

## 2023-08-09 PROBLEM — F41.9 ANXIETY DISORDER, UNSPECIFIED: Chronic | Status: ACTIVE | Noted: 2023-06-30

## 2023-08-09 PROBLEM — E66.9 OBESITY, UNSPECIFIED: Chronic | Status: ACTIVE | Noted: 2023-06-30

## 2023-08-09 PROBLEM — K21.9 GASTRO-ESOPHAGEAL REFLUX DISEASE WITHOUT ESOPHAGITIS: Chronic | Status: ACTIVE | Noted: 2023-06-30

## 2023-08-09 PROBLEM — M35.00 SJOGREN SYNDROME, UNSPECIFIED: Chronic | Status: ACTIVE | Noted: 2023-06-30

## 2023-08-09 PROBLEM — E78.5 HYPERLIPIDEMIA, UNSPECIFIED: Chronic | Status: ACTIVE | Noted: 2023-06-30

## 2023-08-09 PROBLEM — J45.909 UNSPECIFIED ASTHMA, UNCOMPLICATED: Chronic | Status: ACTIVE | Noted: 2023-06-30

## 2023-08-09 PROBLEM — E06.3 AUTOIMMUNE THYROIDITIS: Chronic | Status: ACTIVE | Noted: 2023-06-30

## 2023-08-09 PROBLEM — R79.89 OTHER SPECIFIED ABNORMAL FINDINGS OF BLOOD CHEMISTRY: Chronic | Status: ACTIVE | Noted: 2023-06-30

## 2023-08-09 PROBLEM — K57.92 DIVERTICULITIS OF INTESTINE, PART UNSPECIFIED, WITHOUT PERFORATION OR ABSCESS WITHOUT BLEEDING: Chronic | Status: ACTIVE | Noted: 2023-06-30

## 2023-08-09 PROBLEM — K11.23 CHRONIC SIALOADENITIS: Chronic | Status: ACTIVE | Noted: 2023-06-30

## 2023-08-09 PROBLEM — H40.9 UNSPECIFIED GLAUCOMA: Chronic | Status: ACTIVE | Noted: 2023-06-30

## 2023-08-09 PROBLEM — R25.1 TREMOR, UNSPECIFIED: Chronic | Status: ACTIVE | Noted: 2023-06-30

## 2023-08-09 PROBLEM — R73.03 PREDIABETES: Chronic | Status: ACTIVE | Noted: 2023-06-30

## 2023-08-09 PROBLEM — G47.33 OBSTRUCTIVE SLEEP APNEA (ADULT) (PEDIATRIC): Chronic | Status: ACTIVE | Noted: 2023-06-30

## 2023-08-09 PROBLEM — I10 ESSENTIAL (PRIMARY) HYPERTENSION: Chronic | Status: ACTIVE | Noted: 2023-06-30

## 2023-08-09 PROBLEM — R01.1 CARDIAC MURMUR, UNSPECIFIED: Chronic | Status: ACTIVE | Noted: 2023-06-30

## 2023-08-09 PROBLEM — H26.9 UNSPECIFIED CATARACT: Chronic | Status: ACTIVE | Noted: 2023-06-30

## 2023-08-09 PROBLEM — M26.609 UNSPECIFIED TEMPOROMANDIBULAR JOINT DISORDER, UNSPECIFIED SIDE: Chronic | Status: ACTIVE | Noted: 2023-06-30

## 2023-08-09 PROBLEM — D80.3 SELECTIVE DEFICIENCY OF IMMUNOGLOBULIN G [IGG] SUBCLASSES: Chronic | Status: ACTIVE | Noted: 2023-06-30

## 2023-08-09 PROBLEM — T14.8XXA OTHER INJURY OF UNSPECIFIED BODY REGION, INITIAL ENCOUNTER: Chronic | Status: ACTIVE | Noted: 2023-06-30

## 2023-08-09 PROBLEM — M06.9 RHEUMATOID ARTHRITIS, UNSPECIFIED: Chronic | Status: ACTIVE | Noted: 2023-06-30

## 2023-08-09 PROBLEM — K57.20 DIVERTICULITIS OF LARGE INTESTINE WITH PERFORATION AND ABSCESS WITHOUT BLEEDING: Chronic | Status: ACTIVE | Noted: 2023-06-30

## 2023-08-28 DIAGNOSIS — R10.32 LEFT LOWER QUADRANT PAIN: ICD-10-CM

## 2023-08-30 ENCOUNTER — RESULT REVIEW (OUTPATIENT)
Age: 63
End: 2023-08-30

## 2023-08-30 ENCOUNTER — APPOINTMENT (OUTPATIENT)
Dept: COLORECTAL SURGERY | Facility: AMBULATORY MEDICAL SERVICES | Age: 63
End: 2023-08-30
Payer: COMMERCIAL

## 2023-08-30 PROCEDURE — 45385 COLONOSCOPY W/LESION REMOVAL: CPT

## 2023-08-30 PROCEDURE — 45380 COLONOSCOPY AND BIOPSY: CPT | Mod: 59

## 2023-09-01 ENCOUNTER — NON-APPOINTMENT (OUTPATIENT)
Age: 63
End: 2023-09-01

## 2023-09-11 ENCOUNTER — APPOINTMENT (OUTPATIENT)
Dept: COLORECTAL SURGERY | Facility: HOSPITAL | Age: 63
End: 2023-09-11

## 2023-11-07 ENCOUNTER — APPOINTMENT (OUTPATIENT)
Dept: COLORECTAL SURGERY | Facility: CLINIC | Age: 63
End: 2023-11-07
Payer: COMMERCIAL

## 2023-11-07 DIAGNOSIS — K57.32 DIVERTICULITIS OF LARGE INTESTINE W/OUT PERFORATION OR ABSCESS W/OUT BLEEDING: ICD-10-CM

## 2023-11-07 DIAGNOSIS — D12.5 BENIGN NEOPLASM OF SIGMOID COLON: ICD-10-CM

## 2023-11-07 DIAGNOSIS — E66.01 MORBID (SEVERE) OBESITY DUE TO EXCESS CALORIES: ICD-10-CM

## 2023-11-07 PROCEDURE — 99214 OFFICE O/P EST MOD 30 MIN: CPT

## 2023-11-08 VITALS
SYSTOLIC BLOOD PRESSURE: 137 MMHG | HEIGHT: 64 IN | HEART RATE: 78 BPM | RESPIRATION RATE: 14 BRPM | WEIGHT: 238 LBS | DIASTOLIC BLOOD PRESSURE: 82 MMHG | BODY MASS INDEX: 40.63 KG/M2

## 2024-01-01 NOTE — H&P PST ADULT - HISTORY OF PRESENT ILLNESS
Statement Selected 62 year old female diagnosed with diverticulitis; Pt has been having multiple exacerbations; pt said last year she had perforated colon; c/o left sided abdominal pain and tenderness; she presents to PST for planned laparoscopic  possible open sigmoid colon resection

## 2024-02-05 NOTE — CONSULT LETTER
Date of ED encounter:    Test:   Discussed HIV/HCV testing with the patient in the ED.   Patient received [HIV and HCV] test today    Test Result:  HIV [Negative]  HCV [Negative]    Result notification:  Patient was notified of their test results on [02-] via phone after verifying 3 patient identifiers]    Follow-up plan:   Patient was referred to [No Referral] on [Date]  Patient has appointment at [No Appointment] on [Date]  Barriers to attending appointment: [None]  Missed appointments: [None]    Signed  Kalyan Hoff Jr.  HIV/HCV FOCUS Program  Certified Community Health Worker - Research  Please contact me via email or [employer] Secure Chat with questions     [Dear  ___] : Dear  [unfilled], [Consult Letter:] : I had the pleasure of evaluating your patient, [unfilled]. [Please see my note below.] : Please see my note below. [Consult Closing:] : Thank you very much for allowing me to participate in the care of this patient.  If you have any questions, please do not hesitate to contact me.

## 2024-07-30 ENCOUNTER — APPOINTMENT (OUTPATIENT)
Age: 64
End: 2024-07-30

## 2024-07-30 PROCEDURE — 99203 OFFICE O/P NEW LOW 30 MIN: CPT

## 2024-08-20 DIAGNOSIS — M35.00 SICCA SYNDROME, UNSPECIFIED: ICD-10-CM

## 2024-08-20 DIAGNOSIS — J45.50 SEVERE PERSISTENT ASTHMA, UNCOMPLICATED: ICD-10-CM

## 2024-08-20 DIAGNOSIS — Z87.19 PERSONAL HISTORY OF OTHER DISEASES OF THE DIGESTIVE SYSTEM: ICD-10-CM

## 2024-08-20 DIAGNOSIS — M06.9 RHEUMATOID ARTHRITIS, UNSPECIFIED: ICD-10-CM

## 2024-08-21 ENCOUNTER — APPOINTMENT (OUTPATIENT)
Dept: PEDIATRIC ALLERGY IMMUNOLOGY | Facility: CLINIC | Age: 64
End: 2024-08-21
Payer: COMMERCIAL

## 2024-08-21 VITALS
OXYGEN SATURATION: 97 % | DIASTOLIC BLOOD PRESSURE: 70 MMHG | HEART RATE: 92 BPM | BODY MASS INDEX: 37.39 KG/M2 | WEIGHT: 219 LBS | SYSTOLIC BLOOD PRESSURE: 110 MMHG | TEMPERATURE: 97.4 F | HEIGHT: 64 IN

## 2024-08-21 DIAGNOSIS — J45.40 MODERATE PERSISTENT ASTHMA, UNCOMPLICATED: ICD-10-CM

## 2024-08-21 DIAGNOSIS — T78.05XA ANAPHYLACTIC REACTION DUE TO TREE NUTS AND SEEDS, INITIAL ENCOUNTER: ICD-10-CM

## 2024-08-21 DIAGNOSIS — Z91.013 ALLERGY TO SEAFOOD: ICD-10-CM

## 2024-08-21 DIAGNOSIS — D83.8: ICD-10-CM

## 2024-08-21 DIAGNOSIS — J30.9 ALLERGIC RHINITIS, UNSPECIFIED: ICD-10-CM

## 2024-08-21 DIAGNOSIS — J84.9 INTERSTITIAL PULMONARY DISEASE, UNSPECIFIED: ICD-10-CM

## 2024-08-21 PROCEDURE — 99205 OFFICE O/P NEW HI 60 MIN: CPT

## 2024-08-21 RX ORDER — FLUTICASONE PROPIONATE 220 UG/1
220 AEROSOL, METERED RESPIRATORY (INHALATION) TWICE DAILY
Qty: 3 | Refills: 3 | Status: ACTIVE | COMMUNITY
Start: 2024-08-21 | End: 1900-01-01

## 2024-08-21 RX ORDER — HUMAN IMMUNOGLOBULIN G 5 G/50ML
5 LIQUID INTRAVENOUS
Qty: 4 | Refills: 11 | Status: ACTIVE | COMMUNITY
Start: 2024-08-21 | End: 1900-01-01

## 2024-08-21 RX ORDER — HUMAN IMMUNOGLOBULIN G 10 G/100ML
10 LIQUID INTRAVENOUS
Qty: 4 | Refills: 11 | Status: ACTIVE | COMMUNITY
Start: 2024-08-21 | End: 1900-01-01

## 2024-08-21 NOTE — HISTORY OF PRESENT ILLNESS
[de-identified] : In office to be reevaluated for common variable immunodeficiency, asthma, allergies and food allergies. Returning patient to our office.  Evaluated on 2/25/2020 and prior to that for asthma and recurrent respiratory infections.  Blood work showed low IgG and IgG subclasses, low specific antibodies and she was diagnosed with common variable immunodeficiency.  In 2020 she started IgG replacement.  She received Gamunex subcutaneously first, which caused reaction at the injection site, possibly delayed rash and bilateral leg swelling.  It was discontinued and switched to Hizentra.  After Hizentra subcutaneous also developed leg swelling and severe headache.  She was referred to hematology for workup for hypercoagulable state and IVIG in the office.  Subsequently she received IgG replacement at [facility] and blood [place], initially Privigen 15 g IV weekly.  With the entire dose monthly she had severe headaches.  She did well on Privigen.  Her plan stopped covering Privigen and she had to be switched to Octagam which caused severe headaches even with fractionated dose weekly.  Eventually it was changed to Gammagard, also 15 g weekly.  Since receiving Gammagard, she gets sores in her mouth that are more pronounced after the infusion.  She also had a severe upper respiratory infection in the last 2 weeks, first episode since starting IgG replacement.  She would like to switch back to Privigen, which was well-tolerated without side effects. Blood work was performed by hematologist, she will forward records. Has allergic rhinitis with symptoms all year-round, treated with Zyrtec 10 mg and Flonase 1 spray per nostril per day. In care of pulmonary for asthma and interstitial lung disease.  She has Sjogren's and rheumatoid arthritis.  Interstitial lung disease started after she had COVID-19 vaccines.  She gets spirometry is in for pulmonary function tests regularly.  She used Flovent 220 with spacer, that was discontinued at the end of December.  She was switched to Qvar 80 which causes oral thrush and sore throat.  She cannot use a spacer with Qvar.  She also takes Singulair 10 mg.  She did not tolerate Spiriva. Blood work for allergies (environmental) was negative and total IgE was low in 2020. 2 weeks ago she had sore throat, headache followed by severe cough and chest congestion.  She started albuterol nebulizer every 4 hours.  She expectorated green mucus. COVID-19 test at home had a faint positive line but testing at urgent care was negative (rapid test).  Chest x-ray was negative for pneumonia.  She was treated with Keflex followed by Zithromax.  She developed severe oral thrush, for which she received Diflucan.  She still has a raspy voice.  She did not receive prednisone with this episode, although she did require prednisone in the past for severe respiratory infections. In care of ENT for Sjogren's, had 6 surgeries for salivary glands and antibiotics for salivary gland infections.  ENT referred her to a different physician who diagnosed her with oral lichen planus. In care of rheumatologist for Sjogren's and rheumatoid arthritis, currently on no treatment.  Will see a new rheumatologist. 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  She avoids all nuts but tolerates shrimp, crab, clam.  Carries current EpiPen.  Blood work for food allergies including nuts was negative in 2020. Had 2 episodes of severe diverticulitis, 1 with small perforation, that required antibiotics and hospital admissions.  No surgery performed yet. Multiple medication allergies: Amoxicillin and Ceclor caused rash, but she tolerates Keflex.  Bactrim caused fever.  Tetracycline caused rash.  Biaxin caused change in personality but she tolerates Z-Terence with some GI discomfort.  She had adverse reaction to Xolair which she does not recall.  While receiving nebulized Xopenex she had throat closing, dizziness, she required multiple medications to feel better, it was considered anaphylaxis.  She tolerates albuterol.

## 2024-08-21 NOTE — SOCIAL HISTORY
[de-identified] : Apartment with oil baseboard heating, central air conditioning, area rugs, no pets, no cigarette smoke exposure.  She smoked 1 pack/day for 20 years but quit 26 years ago.

## 2024-08-21 NOTE — ASSESSMENT
[FreeTextEntry1] : Common variable immunodeficiency: On IgG replacement weekly, IV, under care of hematologist.  Had adverse reactions to Gamunex, Hizentra, Octagam and Gammagard.  Tolerated Privigen.  Switch back to Privigen 15 g weekly IV.  Will get precertification.  Forward recent blood work from hematologist to our office. Asthma moderate persistent: Oral thrush and sore throat from using Qvar 80.  Needs inhaler that allows use with spacer (HFA).  Get precertification for fluticasone propionate 220, 2 puffs twice daily with spacer.  Continue Singulair 10 mg.  Continue care with pulmonary physician (asthma and ILD). Food allergies (history of anaphylaxis to nuts, allergy to lobster): Continue avoidance.  Carry EpiPen (instructed in use).  Food allergy plan given and reviewed. Allergic rhinitis: Continue Zyrtec and Flonase 1 spray per nostril daily.

## 2024-08-21 NOTE — REASON FOR VISIT
[Evaluation/Consultation] : an evaluation/consultation of [To Food] : allergy to food [Asthma] : asthma [Immune Evaluation] : immune evaluation

## 2024-08-21 NOTE — PHYSICAL EXAM
[Alert] : alert [No Acute Distress] : no acute distress [Supple] : the neck was supple [Normal S1, S2] : normal S1 and S2 [Regular Rhythm] : with a regular rhythm [Soft] : abdomen soft [Not Tender] : non-tender [No HSM] : no hepato-splenomegaly [Normal Cervical Lymph Nodes] : cervical [Skin Intact] : skin intact  [No clubbing] : no clubbing [No Cyanosis] : no cyanosis [Alert, Awake, Oriented as Age-Appropriate] : alert, awake, oriented as age appropriate [de-identified] : Very raspy voice. [de-identified] : Eyes clear, mild bilateral infra ocular swelling. [de-identified] : Throat mild patchy erythema, no thrush visible.  Tongue with a small ulceration on the right side.  This mucosa pink, no stuffiness or discharge.  Tympanic membranes dull. [de-identified] : Chest clear, good air entry, no wheezing or crackling. [de-identified] : Mild purple discoloration bilateral lower legs.

## 2024-08-22 ENCOUNTER — NON-APPOINTMENT (OUTPATIENT)
Age: 64
End: 2024-08-22

## 2024-08-27 ENCOUNTER — NON-APPOINTMENT (OUTPATIENT)
Age: 64
End: 2024-08-27

## 2024-08-29 ENCOUNTER — NON-APPOINTMENT (OUTPATIENT)
Age: 64
End: 2024-08-29

## 2024-09-06 ENCOUNTER — APPOINTMENT (OUTPATIENT)
Age: 64
End: 2024-09-06

## 2024-09-09 ENCOUNTER — NON-APPOINTMENT (OUTPATIENT)
Age: 64
End: 2024-09-09

## 2024-09-30 ENCOUNTER — NON-APPOINTMENT (OUTPATIENT)
Age: 64
End: 2024-09-30

## 2024-10-09 ENCOUNTER — NON-APPOINTMENT (OUTPATIENT)
Age: 64
End: 2024-10-09

## 2024-10-24 ENCOUNTER — NON-APPOINTMENT (OUTPATIENT)
Age: 64
End: 2024-10-24

## 2024-11-20 ENCOUNTER — NON-APPOINTMENT (OUTPATIENT)
Age: 64
End: 2024-11-20

## 2024-12-30 ENCOUNTER — NON-APPOINTMENT (OUTPATIENT)
Age: 64
End: 2024-12-30

## 2025-01-10 ENCOUNTER — APPOINTMENT (OUTPATIENT)
Dept: COLORECTAL SURGERY | Facility: CLINIC | Age: 65
End: 2025-01-10
Payer: COMMERCIAL

## 2025-01-10 VITALS
OXYGEN SATURATION: 96 % | RESPIRATION RATE: 14 BRPM | SYSTOLIC BLOOD PRESSURE: 139 MMHG | WEIGHT: 230 LBS | HEART RATE: 84 BPM | HEIGHT: 64 IN | BODY MASS INDEX: 39.27 KG/M2 | DIASTOLIC BLOOD PRESSURE: 87 MMHG

## 2025-01-10 DIAGNOSIS — K64.8 OTHER HEMORRHOIDS: ICD-10-CM

## 2025-01-10 PROCEDURE — 99214 OFFICE O/P EST MOD 30 MIN: CPT | Mod: 25

## 2025-01-10 PROCEDURE — 46221 LIGATION OF HEMORRHOID(S): CPT

## 2025-01-11 PROBLEM — K64.8 HEMORRHOIDS, INTERNAL, WITH BLEEDING: Status: ACTIVE | Noted: 2025-01-11

## 2025-02-03 ENCOUNTER — APPOINTMENT (OUTPATIENT)
Dept: OTOLARYNGOLOGY | Facility: CLINIC | Age: 65
End: 2025-02-03

## 2025-02-04 ENCOUNTER — APPOINTMENT (OUTPATIENT)
Dept: COLORECTAL SURGERY | Facility: CLINIC | Age: 65
End: 2025-02-04

## 2025-02-24 ENCOUNTER — NON-APPOINTMENT (OUTPATIENT)
Age: 65
End: 2025-02-24

## 2025-02-26 ENCOUNTER — NON-APPOINTMENT (OUTPATIENT)
Age: 65
End: 2025-02-26

## 2025-03-04 ENCOUNTER — APPOINTMENT (OUTPATIENT)
Dept: COLORECTAL SURGERY | Facility: CLINIC | Age: 65
End: 2025-03-04

## 2025-03-10 ENCOUNTER — NON-APPOINTMENT (OUTPATIENT)
Age: 65
End: 2025-03-10

## 2025-03-12 ENCOUNTER — NON-APPOINTMENT (OUTPATIENT)
Age: 65
End: 2025-03-12

## 2025-03-24 ENCOUNTER — NON-APPOINTMENT (OUTPATIENT)
Age: 65
End: 2025-03-24

## 2025-03-27 ENCOUNTER — NON-APPOINTMENT (OUTPATIENT)
Age: 65
End: 2025-03-27

## 2025-04-10 ENCOUNTER — NON-APPOINTMENT (OUTPATIENT)
Age: 65
End: 2025-04-10

## 2025-04-14 ENCOUNTER — NON-APPOINTMENT (OUTPATIENT)
Age: 65
End: 2025-04-14

## 2025-04-17 ENCOUNTER — NON-APPOINTMENT (OUTPATIENT)
Age: 65
End: 2025-04-17

## 2025-04-17 ENCOUNTER — APPOINTMENT (OUTPATIENT)
Dept: COLORECTAL SURGERY | Facility: AMBULATORY MEDICAL SERVICES | Age: 65
End: 2025-04-17

## 2025-04-24 ENCOUNTER — NON-APPOINTMENT (OUTPATIENT)
Age: 65
End: 2025-04-24

## 2025-05-07 ENCOUNTER — NON-APPOINTMENT (OUTPATIENT)
Age: 65
End: 2025-05-07

## 2025-05-14 ENCOUNTER — NON-APPOINTMENT (OUTPATIENT)
Age: 65
End: 2025-05-14

## 2025-06-05 ENCOUNTER — NON-APPOINTMENT (OUTPATIENT)
Age: 65
End: 2025-06-05

## 2025-06-11 ENCOUNTER — NON-APPOINTMENT (OUTPATIENT)
Age: 65
End: 2025-06-11

## 2025-06-17 ENCOUNTER — APPOINTMENT (OUTPATIENT)
Dept: COLORECTAL SURGERY | Facility: CLINIC | Age: 65
End: 2025-06-17
Payer: COMMERCIAL

## 2025-06-17 PROCEDURE — 46221 LIGATION OF HEMORRHOID(S): CPT

## 2025-06-17 PROCEDURE — 99213 OFFICE O/P EST LOW 20 MIN: CPT | Mod: 25

## 2025-07-15 ENCOUNTER — APPOINTMENT (OUTPATIENT)
Dept: COLORECTAL SURGERY | Facility: CLINIC | Age: 65
End: 2025-07-15

## 2025-07-30 ENCOUNTER — NON-APPOINTMENT (OUTPATIENT)
Age: 65
End: 2025-07-30

## 2025-08-12 ENCOUNTER — NON-APPOINTMENT (OUTPATIENT)
Age: 65
End: 2025-08-12

## 2025-08-14 ENCOUNTER — NON-APPOINTMENT (OUTPATIENT)
Age: 65
End: 2025-08-14

## 2025-08-27 ENCOUNTER — NON-APPOINTMENT (OUTPATIENT)
Age: 65
End: 2025-08-27

## 2025-08-28 ENCOUNTER — APPOINTMENT (OUTPATIENT)
Dept: PEDIATRIC ALLERGY IMMUNOLOGY | Facility: CLINIC | Age: 65
End: 2025-08-28
Payer: COMMERCIAL

## 2025-08-28 DIAGNOSIS — J30.9 ALLERGIC RHINITIS, UNSPECIFIED: ICD-10-CM

## 2025-08-28 DIAGNOSIS — J45.40 MODERATE PERSISTENT ASTHMA, UNCOMPLICATED: ICD-10-CM

## 2025-08-28 DIAGNOSIS — D83.8: ICD-10-CM

## 2025-08-28 PROCEDURE — 99214 OFFICE O/P EST MOD 30 MIN: CPT

## 2025-09-02 ENCOUNTER — NON-APPOINTMENT (OUTPATIENT)
Age: 65
End: 2025-09-02

## 2025-09-09 ENCOUNTER — APPOINTMENT (OUTPATIENT)
Dept: COLORECTAL SURGERY | Facility: CLINIC | Age: 65
End: 2025-09-09
Payer: COMMERCIAL

## 2025-09-09 DIAGNOSIS — K57.32 DIVERTICULITIS OF LARGE INTESTINE W/OUT PERFORATION OR ABSCESS W/OUT BLEEDING: ICD-10-CM

## 2025-09-09 PROCEDURE — 99214 OFFICE O/P EST MOD 30 MIN: CPT | Mod: 93

## 2025-09-17 ENCOUNTER — NON-APPOINTMENT (OUTPATIENT)
Age: 65
End: 2025-09-17